# Patient Record
Sex: FEMALE | Race: WHITE | NOT HISPANIC OR LATINO | Employment: PART TIME
[De-identification: names, ages, dates, MRNs, and addresses within clinical notes are randomized per-mention and may not be internally consistent; named-entity substitution may affect disease eponyms.]

---

## 2020-02-24 ENCOUNTER — HEALTH MAINTENANCE LETTER (OUTPATIENT)
Age: 40
End: 2020-02-24

## 2020-12-13 ENCOUNTER — HEALTH MAINTENANCE LETTER (OUTPATIENT)
Age: 40
End: 2020-12-13

## 2021-04-17 ENCOUNTER — HEALTH MAINTENANCE LETTER (OUTPATIENT)
Age: 41
End: 2021-04-17

## 2021-09-26 ENCOUNTER — HEALTH MAINTENANCE LETTER (OUTPATIENT)
Age: 41
End: 2021-09-26

## 2022-05-08 ENCOUNTER — HEALTH MAINTENANCE LETTER (OUTPATIENT)
Age: 42
End: 2022-05-08

## 2023-01-14 ENCOUNTER — HEALTH MAINTENANCE LETTER (OUTPATIENT)
Age: 43
End: 2023-01-14

## 2023-06-02 ENCOUNTER — HEALTH MAINTENANCE LETTER (OUTPATIENT)
Age: 43
End: 2023-06-02

## 2023-11-23 ENCOUNTER — APPOINTMENT (OUTPATIENT)
Dept: CT IMAGING | Facility: CLINIC | Age: 43
End: 2023-11-23
Attending: EMERGENCY MEDICINE
Payer: COMMERCIAL

## 2023-11-23 ENCOUNTER — HOSPITAL ENCOUNTER (EMERGENCY)
Facility: CLINIC | Age: 43
Discharge: HOME | End: 2023-11-23
Attending: EMERGENCY MEDICINE | Admitting: EMERGENCY MEDICINE
Payer: COMMERCIAL

## 2023-11-23 VITALS
TEMPERATURE: 99 F | HEART RATE: 73 BPM | SYSTOLIC BLOOD PRESSURE: 98 MMHG | RESPIRATION RATE: 18 BRPM | OXYGEN SATURATION: 99 % | DIASTOLIC BLOOD PRESSURE: 74 MMHG

## 2023-11-23 DIAGNOSIS — N23 RENAL COLIC: ICD-10-CM

## 2023-11-23 LAB
ALBUMIN UR-MCNC: NEGATIVE MG/DL
ANION GAP SERPL CALCULATED.3IONS-SCNC: 8 MMOL/L (ref 7–15)
APPEARANCE UR: CLEAR
BASOPHILS # BLD AUTO: 0 10E3/UL (ref 0–0.2)
BASOPHILS NFR BLD AUTO: 1 %
BILIRUB UR QL STRIP: NEGATIVE
BUN SERPL-MCNC: 11.8 MG/DL (ref 6–20)
CALCIUM SERPL-MCNC: 8.5 MG/DL (ref 8.6–10)
CHLORIDE SERPL-SCNC: 106 MMOL/L (ref 98–107)
COLOR UR AUTO: ABNORMAL
CREAT SERPL-MCNC: 0.79 MG/DL (ref 0.51–0.95)
DEPRECATED HCO3 PLAS-SCNC: 25 MMOL/L (ref 22–29)
EGFRCR SERPLBLD CKD-EPI 2021: >90 ML/MIN/1.73M2
EOSINOPHIL # BLD AUTO: 0.2 10E3/UL (ref 0–0.7)
EOSINOPHIL NFR BLD AUTO: 2 %
ERYTHROCYTE [DISTWIDTH] IN BLOOD BY AUTOMATED COUNT: 11.9 % (ref 10–15)
GLUCOSE SERPL-MCNC: 103 MG/DL (ref 70–99)
GLUCOSE UR STRIP-MCNC: NEGATIVE MG/DL
HCT VFR BLD AUTO: 38.9 % (ref 35–47)
HGB BLD-MCNC: 12.7 G/DL (ref 11.7–15.7)
HGB UR QL STRIP: NEGATIVE
HOLD SPECIMEN: NORMAL
HOLD SPECIMEN: NORMAL
IMM GRANULOCYTES # BLD: 0 10E3/UL
IMM GRANULOCYTES NFR BLD: 0 %
KETONES UR STRIP-MCNC: NEGATIVE MG/DL
LEUKOCYTE ESTERASE UR QL STRIP: ABNORMAL
LYMPHOCYTES # BLD AUTO: 1.9 10E3/UL (ref 0.8–5.3)
LYMPHOCYTES NFR BLD AUTO: 27 %
MCH RBC QN AUTO: 31.2 PG (ref 26.5–33)
MCHC RBC AUTO-ENTMCNC: 32.6 G/DL (ref 31.5–36.5)
MCV RBC AUTO: 96 FL (ref 78–100)
MONOCYTES # BLD AUTO: 0.6 10E3/UL (ref 0–1.3)
MONOCYTES NFR BLD AUTO: 8 %
NEUTROPHILS # BLD AUTO: 4.4 10E3/UL (ref 1.6–8.3)
NEUTROPHILS NFR BLD AUTO: 62 %
NITRATE UR QL: NEGATIVE
NRBC # BLD AUTO: 0 10E3/UL
NRBC BLD AUTO-RTO: 0 /100
PH UR STRIP: 7 [PH] (ref 5–7)
PLATELET # BLD AUTO: 228 10E3/UL (ref 150–450)
POTASSIUM SERPL-SCNC: 4.3 MMOL/L (ref 3.4–5.3)
RBC # BLD AUTO: 4.07 10E6/UL (ref 3.8–5.2)
RBC URINE: 2 /HPF
SODIUM SERPL-SCNC: 139 MMOL/L (ref 135–145)
SP GR UR STRIP: 1.01 (ref 1–1.03)
SQUAMOUS EPITHELIAL: 3 /HPF
UROBILINOGEN UR STRIP-MCNC: NORMAL MG/DL
WBC # BLD AUTO: 7.1 10E3/UL (ref 4–11)
WBC URINE: 4 /HPF

## 2023-11-23 PROCEDURE — 85025 COMPLETE CBC W/AUTO DIFF WBC: CPT | Performed by: EMERGENCY MEDICINE

## 2023-11-23 PROCEDURE — 74176 CT ABD & PELVIS W/O CONTRAST: CPT

## 2023-11-23 PROCEDURE — 80048 BASIC METABOLIC PNL TOTAL CA: CPT | Performed by: EMERGENCY MEDICINE

## 2023-11-23 PROCEDURE — 81001 URINALYSIS AUTO W/SCOPE: CPT | Performed by: EMERGENCY MEDICINE

## 2023-11-23 PROCEDURE — 36415 COLL VENOUS BLD VENIPUNCTURE: CPT | Performed by: EMERGENCY MEDICINE

## 2023-11-23 PROCEDURE — 82374 ASSAY BLOOD CARBON DIOXIDE: CPT | Performed by: EMERGENCY MEDICINE

## 2023-11-23 PROCEDURE — 87086 URINE CULTURE/COLONY COUNT: CPT | Performed by: EMERGENCY MEDICINE

## 2023-11-23 PROCEDURE — 99284 EMERGENCY DEPT VISIT MOD MDM: CPT | Mod: 25

## 2023-11-23 RX ORDER — ONDANSETRON 4 MG/1
4 TABLET, ORALLY DISINTEGRATING ORAL EVERY 6 HOURS PRN
Qty: 10 TABLET | Refills: 0 | Status: SHIPPED | OUTPATIENT
Start: 2023-11-23 | End: 2023-11-26

## 2023-11-23 RX ORDER — TAMSULOSIN HYDROCHLORIDE 0.4 MG/1
0.4 CAPSULE ORAL DAILY
Qty: 10 CAPSULE | Refills: 0 | Status: SHIPPED | OUTPATIENT
Start: 2023-11-23 | End: 2023-12-03

## 2023-11-23 ASSESSMENT — ACTIVITIES OF DAILY LIVING (ADL): ADLS_ACUITY_SCORE: 37

## 2023-11-24 ENCOUNTER — HOSPITAL ENCOUNTER (EMERGENCY)
Facility: CLINIC | Age: 43
Discharge: HOME | End: 2023-11-24
Attending: EMERGENCY MEDICINE | Admitting: EMERGENCY MEDICINE
Payer: COMMERCIAL

## 2023-11-24 ENCOUNTER — TELEPHONE (OUTPATIENT)
Dept: EMERGENCY MEDICINE | Facility: CLINIC | Age: 43
End: 2023-11-24
Payer: COMMERCIAL

## 2023-11-24 VITALS
TEMPERATURE: 97.1 F | SYSTOLIC BLOOD PRESSURE: 128 MMHG | DIASTOLIC BLOOD PRESSURE: 76 MMHG | HEART RATE: 69 BPM | OXYGEN SATURATION: 100 % | RESPIRATION RATE: 18 BRPM

## 2023-11-24 DIAGNOSIS — N23 RENAL COLIC: ICD-10-CM

## 2023-11-24 LAB — BACTERIA UR CULT: ABNORMAL

## 2023-11-24 PROCEDURE — 99283 EMERGENCY DEPT VISIT LOW MDM: CPT

## 2023-11-24 RX ORDER — OXYCODONE HYDROCHLORIDE 5 MG/1
5 TABLET ORAL EVERY 6 HOURS PRN
Qty: 12 TABLET | Refills: 0 | Status: SHIPPED | OUTPATIENT
Start: 2023-11-24 | End: 2023-11-27

## 2023-11-24 NOTE — ED TRIAGE NOTES
Here for concern of right back pain and left abdominal cramping started couple hours ago. Stated was diagnose with UTI x2 days ago, is currently taking Nitrofurantoin. ABCs intact.      Triage Assessment (Adult)       Row Name 11/23/23 1914          Triage Assessment    Airway WDL WDL        Respiratory WDL    Respiratory WDL WDL        Cardiac WDL    Cardiac WDL WDL

## 2023-11-24 NOTE — DISCHARGE INSTRUCTIONS

## 2023-11-24 NOTE — ED PROVIDER NOTES
History     Chief Complaint:  Flank Pain       HPI   Trish Noriega is a 43 year old female who presents to the ED with ongoing right flank pain.  Patient was seen in our emergency department yesterday and was diagnosed with a 6 mm kidney stone with a negative UA.  She reports that she was discharged home without pain medications.  At home she had a significant flare of her pain prompting her to come to the emergency department.  However by the time she arrived here her pain flare had resolved.  She denies any fevers, chills, nausea or vomiting.  She has not had kidney stone before in the past.    Review of External Notes:   Patient was seen in our emergency department yesterday and was diagnosed with a 6 mm stone at the right UVJ with some mild hydroureter.  Urine shows no signs of infection.  Creatinine was at baseline.    Medications:    oxyCODONE (ROXICODONE) 5 MG tablet  ibuprofen (ADVIL,MOTRIN) 400 MG tablet  ondansetron (ZOFRAN ODT) 4 MG ODT tab  Prenatal Multivit-Min-Fe-FA (PRENATAL PO)  senna-docusate (SENOKOT-S;PERICOLACE) 8.6-50 MG per tablet  tamsulosin (FLOMAX) 0.4 MG capsule        Past Medical History:    Past Medical History:   Diagnosis Date    Allergic state     Hearing impaired     PONV (postoperative nausea and vomiting)        Past Surgical History:    Past Surgical History:   Procedure Laterality Date    C/SECTION, CLASSICAL       SECTION  2012    Procedure:  SECTION;  Repeat  Section ;  Surgeon: Juan C Carmona MD;  Location: [facility]     SECTION N/A 2015    Procedure:  SECTION;  Surgeon: Juan C Carmona MD;  Location: [facility]    ENT SURGERY      left ear surgery        Physical Exam   Patient Vitals for the past 24 hrs:   BP Temp Pulse Resp SpO2   23 0539 123/83 -- -- -- --   23 0538 -- 97.1  F (36.2  C) 72 18 100 %        Physical Exam  General: Patient is awake, alert and interactive when I enter the room.   Head:  The scalp, face, and head appear normal  Eyes: Conjunctivae and sclerae are normal  Neck: Normal range of motion.   CV: Regular rate.   Resp:  No respiratory distress.   GI: abdomen is soft, no rigidity. No evidence of pulsatile mass. No fluid waves or evidence of ascites. No distension. No hernias or bruising are noted in detailed exam. No CVA tenderness.    MS: Normal tone.   Skin: Normal capillary refill noted  Neuro: Speech is normal and fluent. Face is symmetric. Moving all extremities.   Psych:  Normal affect.  Appropriate interactions.    Emergency Department Course     Emergency Department Course & Assessments:    Disposition:  The patient was discharged to home.     Impression & Plan      Medical Decision Making:  Patient is a 43-year-old woman who presents to the emergency department with ongoing right-sided flank pain.  Patient was seen in our emergency department yesterday and was diagnosed with a 6 mm kidney stone at the UVJ.  Urine was negative for signs of infection and creatinine was at baseline.  Unfortunately she was discharged home without any pain medications.  She had a flareup of pain while she was at home last night which prompted her to return to the emergency department.  Currently she has pain and only a 2 out of 10 despite no interventions in the emergency department.  She declined any further pain meds at this time.  I will write her prescription for oxycodone and have her follow-up with urology as needed.  She should still use ibuprofen and Tylenol as her baseline pain medications.  Patient already has prescriptions for Zofran and Flomax which I instructed her how to use.    Diagnosis:    ICD-10-CM    1. Renal colic  N23            Discharge Medications:  New Prescriptions    OXYCODONE (ROXICODONE) 5 MG TABLET    Take 1 tablet (5 mg) by mouth every 6 hours as needed for breakthrough pain          MD Dima Sheets, Kavin Jay MD  11/24/23 0695

## 2023-11-24 NOTE — ED PROVIDER NOTES
History     Chief Complaint:  No chief complaint on file.       HPI   Trish Noriega is a 43 year old female who presents with right flank and right left-sided abdominal pain.  Patient is a 43-year-old female who was recently seen in urgent care.  Patient was told she had a urine infection and placed on Macrobid.  In review of the chart patient's urine culture is growing out beta-hemolytic strep and is likely contaminant.  Patient had urinary frequency without dysuria no fever.  Patient with history of T8 continues to have pain after a few days of Macrobid.  Patient describes more of a urgency to urinate.  She has had no fever chills no vomiting diarrhea.      Independent Historian:   None - Patient Only    Review of External Notes:          Medications:    Oxycodone   Senna-docusate   Macrobid     Past Medical History:    Hearing impaired  Allergic state      Past Surgical History:     x2  Ear surgery        Physical Exam   Patient Vitals for the past 24 hrs:   BP Temp Temp src Pulse Resp SpO2   23 1915 133/77 99  F (37.2  C) Temporal 73 18 99 %        Physical Exam  Vitals reviewed.   Eyes:      General: No scleral icterus.     Pupils: Pupils are equal, round, and reactive to light.   Cardiovascular:      Rate and Rhythm: Normal rate.   Pulmonary:      Effort: Pulmonary effort is normal.   Abdominal:      General: Abdomen is flat. Bowel sounds are normal.   Musculoskeletal:         General: Normal range of motion.   Skin:     General: Skin is warm.      Capillary Refill: Capillary refill takes less than 2 seconds.   Neurological:      General: No focal deficit present.      Mental Status: She is alert and oriented to person, place, and time.   Psychiatric:         Mood and Affect: Mood normal.           Emergency Department Course       Imaging:  Abd/pelvis CT no contrast - Stone Protocol   Final Result   IMPRESSION:    1.  6 mm stone is noted at the right ureterovesicular junction causing very  mild hydroureter and mild fibrosis. Additional 1 mm nonobstructive left kidney. No other stones are identified.                Laboratory:  Labs Ordered and Resulted from Time of ED Arrival to Time of ED Departure   ROUTINE UA WITH MICROSCOPIC REFLEX TO CULTURE - Abnormal       Result Value    Color Urine Straw      Appearance Urine Clear      Glucose Urine Negative      Bilirubin Urine Negative      Ketones Urine Negative      Specific Gravity Urine 1.008      Blood Urine Negative      pH Urine 7.0      Protein Albumin Urine Negative      Urobilinogen Urine Normal      Nitrite Urine Negative      Leukocyte Esterase Urine Moderate (*)     RBC Urine 2      WBC Urine 4      Squamous Epithelials Urine 3 (*)    BASIC METABOLIC PANEL - Abnormal    Sodium 139      Potassium 4.3      Chloride 106      Carbon Dioxide (CO2) 25      Anion Gap 8      Urea Nitrogen 11.8      Creatinine 0.79      GFR Estimate >90      Calcium 8.5 (*)     Glucose 103 (*)    CBC WITH PLATELETS AND DIFFERENTIAL    WBC Count 7.1      RBC Count 4.07      Hemoglobin 12.7      Hematocrit 38.9      MCV 96      MCH 31.2      MCHC 32.6      RDW 11.9      Platelet Count 228      % Neutrophils 62      % Lymphocytes 27      % Monocytes 8      % Eosinophils 2      % Basophils 1      % Immature Granulocytes 0      NRBCs per 100 WBC 0      Absolute Neutrophils 4.4      Absolute Lymphocytes 1.9      Absolute Monocytes 0.6      Absolute Eosinophils 0.2      Absolute Basophils 0.0      Absolute Immature Granulocytes 0.0      Absolute NRBCs 0.0            Emergency Department Course & Assessments:      Interventions:  Medications - No data to display     Assessments:  2010 I obtained history and examined the patient as noted above.     Independent Interpretation (X-rays, CTs, rhythm strip):  None    Consultations/Discussion of Management or Tests:  None        Social Determinants of Health affecting care:   None    Disposition:  The patient was discharged to home.      Impression & Plan        Medical Decision Making:  Patient presents with urinary frequency and urinary symptoms with a UA that was likely contaminated in urgent care.  This was repeated and still negative but CT was recommended due to microscopic hematuria in her first sample.  CT does identify a stone in the right UVJ likely the size or urinary symptoms.  Offered straining urine when she goes to the bathroom Flomax and ibuprofen and follow-up with urology.  Patient was offered reassurance and discharged home.  Patient was well-appearing in the emergency room without requirement for pain control opiates were discouraged if the pain is tolerable      Diagnosis:    ICD-10-CM    1. Renal colic  N23            Discharge Medications:  Discharge Medication List as of 11/23/2023 10:22 PM        START taking these medications    Details   ondansetron (ZOFRAN ODT) 4 MG ODT tab Take 1 tablet (4 mg) by mouth every 6 hours as needed for nausea or vomiting, Disp-10 tablet, R-0, Local Print      tamsulosin (FLOMAX) 0.4 MG capsule Take 1 capsule (0.4 mg) by mouth daily for 10 doses, Disp-10 capsule, R-0, Local Print                Scribe Disclosure:  Randolph MAYA, am serving as a scribe at 8:10 PM on 11/23/2023 to document services personally performed by Serge Stephens MD based on my observations and the provider's statements to me.   11/23/2023   Serge Stephens MD Goodman, Brian Samuel, MD  11/25/23 8616

## 2023-11-24 NOTE — ED TRIAGE NOTES
Pt dx with kidney stones earlier in the evening. Pt reports she wasn't sent home with pain medication and is wanting something for pain

## 2023-11-24 NOTE — TELEPHONE ENCOUNTER
[facility] Emergency Department/Urgent Care Lab result notification  [Note:  ED Lab Results RN will reference the [facility] Emergency Dept visit note prior to contacting patient AND/OR prior to consulting Emergency Dept Provider.  Highlights of Emergency Dept visit in information summary at the bottom of this telephone note]    1. Reason for call  Notify of lab results  Assess patient symptoms [if necessary]  Review ED Providers recommendations/discharge instructions (if necessary)  Advise per [facility] ED lab result protocol    2. Lab Result (including Rx patient on, if applicable).  If culture, copy of lab report at bottom.  Final urine culture on 11/24/23 shows the presence of bacteria(s): 10,000 - 50,000 CFU/mL Streptococcus agalactiae (Group B Streptococcus)  [facility] Emergency Dept discharge antibiotic: None  Recommendations in treatment per [facility] lab result Urine Culture protocol.    3. RN Assessment (Patient's current Symptoms):  Time of call: 1420  Assessment: Pt states she is still having pain from her kidney stone, and has had frequency and burning symptoms with urination. Also states she went to an [facility] clinic and is currently on Macrobid. Will finish Macrobid and follow up with urology     4. RN Recommendations/Instructions per [place] ED lab result protocol  [facility] lab result protocol used: Urine Sonia Chambers was notified of lab result and treatment recommendations    5. Please Contact your PCP clinic or return to the Emergency department if your:  Symptoms return.  Symptoms do not improve after 3 days on antibiotic.  Symptoms do not resolve after completing antibiotic.  Symptoms worsen or other concerning symptoms.    Information summary from Emergency Dept/Urgent Care visit on 11/23/23  Symptoms reported at ED/UC visit (Chief complaint, HPI) Trish Noriega is a 43 year old female who presents to the ED with ongoing right flank pain.   Patient was seen in our emergency department yesterday and was diagnosed with a 6 mm kidney stone with a negative UA.  She reports that she was discharged home without pain medications.  At home she had a significant flare of her pain prompting her to come to the emergency department.  However by the time she arrived here her pain flare had resolved.  She denies any fevers, chills, nausea or vomiting.  She has not had kidney stone before in the past    Significant Medical hx, if applicable (i.e. CKD, diabetes) Reviewed   Allergies Allergies   Allergen Reactions    Cocoa Butter Rash    Iodine Rash    Neomycin Sulfate Rash     Ear drops      Weight, if applicable Wt Readings from Last 2 Encounters:   09/25/15 69.9 kg (154 lb)   12/21/12 70.3 kg (155 lb)      Coumadin/Warfarin [Yes /No] No   Creatinine Level (mg/dl) Creatinine   Date Value Ref Range Status   11/23/2023 0.79 0.51 - 0.95 mg/dL Final   09/27/2015 0.68 0.52 - 1.04 mg/dL Final      Creatinine clearance (ml/min), if applicable Creatinine clearance cannot be calculated (Unknown ideal weight.)   Pregnant (Yes/No/NA) No   Breastfeeding (Yes/No/NA) No   ED/UC Provider Impression and Plan (applicable information) Patient is a 43-year-old woman who presents to the emergency department with ongoing right-sided flank pain.  Patient was seen in our emergency department yesterday and was diagnosed with a 6 mm kidney stone at the UVJ.  Urine was negative for signs of infection and creatinine was at baseline.  Unfortunately she was discharged home without any pain medications.  She had a flareup of pain while she was at home last night which prompted her to return to the emergency department.  Currently she has pain and only a 2 out of 10 despite no interventions in the emergency department.  She declined any further pain meds at this time.  I will write her prescription for oxycodone and have her follow-up with urology as needed.  She should still use ibuprofen and  Tylenol as her baseline pain medications.  Patient already has prescriptions for Zofran and Flomax which I instructed her how to use.    ED/UC diagnosis Renal colic   ED/UC Provider Kavin Ch MD        Copy of Lab report (if applicable)        Lee Hartley RN  [facility]  Emergency Dept Lab Result RN  Ph# [phone]

## 2023-11-27 DIAGNOSIS — R69 DIAGNOSIS UNKNOWN: Primary | ICD-10-CM

## 2023-12-14 ENCOUNTER — TELEPHONE (OUTPATIENT)
Dept: UROLOGY | Facility: CLINIC | Age: 43
End: 2023-12-14

## 2023-12-14 ENCOUNTER — VIRTUAL VISIT (OUTPATIENT)
Dept: UROLOGY | Facility: CLINIC | Age: 43
End: 2023-12-14
Payer: COMMERCIAL

## 2023-12-14 DIAGNOSIS — N20.1 RIGHT URETERAL STONE: Primary | ICD-10-CM

## 2023-12-14 DIAGNOSIS — N20.1 CALCULUS OF URETEROVESICAL JUNCTION (UVJ): ICD-10-CM

## 2023-12-14 PROCEDURE — 99204 OFFICE O/P NEW MOD 45 MIN: CPT | Mod: VID | Performed by: NURSE PRACTITIONER

## 2023-12-14 RX ORDER — TAMSULOSIN HYDROCHLORIDE 0.4 MG/1
0.4 CAPSULE ORAL DAILY
Qty: 30 CAPSULE | Refills: 0 | Status: SHIPPED | OUTPATIENT
Start: 2023-12-14

## 2023-12-14 ASSESSMENT — PAIN SCALES - GENERAL: PAINLEVEL: MILD PAIN (2)

## 2023-12-14 NOTE — PROGRESS NOTES
Urology Video Office Visit    Video-Visit Details    Type of service:  Video Visit    Video Start Time: 0754    Video End Time: 0809    Originating Location (pt. Location): Home    Distant Location (provider location):  Off-site     Platform used for Video Visit: "[employer]           Assessment and Plan:     Assessment: 43 year old female with a right 6mm UVJ stone     Plan:  -Reviewed CT scan with patient. Noted right 6mm UVJ stone.   -The patient and I discussed the diagnosis and natural history of urolithiasis. Discussed future appointment to discussed stone prevention measurements.   -Pt is concerns as she is scheduled for a trip to [place] from 12/26/23-01/02/24.   -We discussed treatment options including observation with MET x 3-4 weeks vs. ureteroscopy and laser lithotripsy. I counseled the patient regarding the potential need for a ureteral stent after treatment and the necessity of removing the stent after surgery. I also discussed the possibility of additional procedures to render the patient stone free.  After discussing risks, benefits, and alternatives to treatment, patient elects to proceed with right ureteroscopy.  -Discussed case with Dr. Duque. Will start on Augmentin BID x 7 days.   -Please use acetaminophen, ibuprofen, dimenhydrinate, and oxycodone PRN for pain control. Will start on tamsulosin 0.4mg PO daily to help with stone passage.   -If having severe flank pain, fevers, chills, nausea, or vomiting please notify Urology clinic or be seen in the ER.     Esthela Branch CNP  Department of Urology  December 14, 2023    I spent a total of 50 minutes spent on the date of the encounter doing chart review, history and exam, documentation, and further activities as noted above.          Chief Complaint:   Right Ureteral Stone         History of Present Illness:    Trish Noriega is a pleasant 43 year old female who presents with concerns of a right ureteral stone.     Ms. Noriega was seen in the  ER on 23 for concerns of right flank pain and abdominal pain. She was previously seen 2 days prior in UC on 23 for concerns of a UTI  due to urinary frequency. Urine culture on 23 revealed 10-50 Group Strep B she was placed on a course Macrobid at that time.     CT scan performed on 23 (images personally reviewed) revealed a right 6mm UVJ stone with mild hydronephrosis. No noted right renal stones. One 1-2mm nonobstructing left renal stone without hydronephrosis.      She was seen again on 23 due to severe right flank pain. She was not prescribe any additional pain medications with her last ER visit. She was discharge with oxycodone.     She notes continue symptoms of urinary frequency and urgency with bladder pressure. Notes right flank pain is manageable with OTC analgesics at this time. Denies any f/c/n/v, gross hematuria, or dysuria.     This is her first kidney stone. Denies any family history of nephrolithiasis.     Stone Risk Factors: None         Past Medical History:     Past Medical History:   Diagnosis Date    Allergic state     Hearing impaired     left ear    PONV (postoperative nausea and vomiting)           Past Surgical History:     Past Surgical History:   Procedure Laterality Date    C/SECTION, CLASSICAL       SECTION  2012    Procedure:  SECTION;  Repeat  Section ;  Surgeon: Juan C Carmona MD;  Location: [facility]     SECTION N/A 2015    Procedure:  SECTION;  Surgeon: Juan C Carmona MD;  Location: [facility]    ENT SURGERY      left ear surgery            Medications     Current Outpatient Medications   Medication    ibuprofen (ADVIL,MOTRIN) 400 MG tablet    Prenatal Multivit-Min-Fe-FA (PRENATAL PO)    senna-docusate (SENOKOT-S;PERICOLACE) 8.6-50 MG per tablet     No current facility-administered medications for this visit.            Family History:   No family history on file.         Social History:      Social History     Socioeconomic History    Marital status:      Spouse name: Not on file    Number of children: Not on file    Years of education: Not on file    Highest education level: Not on file   Occupational History    Not on file   Tobacco Use    Smoking status: Never    Smokeless tobacco: Never   Substance and Sexual Activity    Alcohol use: No    Drug use: No    Sexual activity: Yes     Partners: Male   Other Topics Concern    Not on file   Social History Narrative    Not on file     Social Determinants of Health     Financial Resource Strain: Not on file   Food Insecurity: Not on file   Transportation Needs: Not on file   Physical Activity: Not on file   Stress: Not on file   Social Connections: Not on file   Interpersonal Safety: Not on file   Housing Stability: Not on file            Allergies:   Cocoa butter, Iodine, and Neomycin sulfate         Review of Systems:  From intake questionnaire   Negative 14 system review except as noted on HPI, nurse's note.         Physical Exam:   General Appearance: Well groomed, hygenic  Eyes: No redness, discharge  Respiratory: No cough, no respiratory distress or labored breathing  Musculoskeletal: Grossly normal, full range of motion in upper extremities, no gross deficits  Skin: No discoloration or apparent rashes  Neurologic - No tremors  Psychiatric - Alert and oriented  The rest of a comprehensive physical examination is deferred due to video visit restrictions      Labs:    I personally reviewed all applicable laboratory data and went over findings with patient  Significant for:    CBC RESULTS:  Recent Labs   Lab Test 11/23/23  1935 09/27/15  1330   WBC 7.1  --    HGB 12.7  --     180        BMP RESULTS:  Recent Labs   Lab Test 11/23/23  1935 09/27/15  1330     --    POTASSIUM 4.3  --    CHLORIDE 106  --    CO2 25  --    ANIONGAP 8  --    *  --    BUN 11.8  --    CR 0.79 0.68   GFRESTIMATED >90 >90  Non  GFR  Calc     GFRESTBLACK  --  >90   GFR Calc     NISSA 8.5*  --        UA RESULTS:   Recent Labs   Lab Test 11/23/23  1918 09/27/15  1325   SG 1.008 1.008   URINEPH 7.0 7.0   NITRITE Negative Negative   RBCU 2  --    WBCU 4  --        CALCIUM RESULTS  Lab Results   Component Value Date    NISSA 8.5 11/23/2023           Imaging:    I personally reviewed all applicable imaging and went over the below findings with patient.    Results for orders placed or performed during the hospital encounter of 11/23/23   Abd/pelvis CT no contrast - Stone Protocol    Narrative    EXAM: CT ABDOMEN PELVIS W/O CONTRAST  LOCATION: [facility]  DATE: 11/23/2023    INDICATION: RIGHT FLANK PAIN  COMPARISON: None.  TECHNIQUE: CT scan of the abdomen and pelvis was performed without IV contrast. Multiplanar reformats were obtained. Dose reduction techniques were used.  CONTRAST: None.    FINDINGS:   LOWER CHEST: Normal.    HEPATOBILIARY: Normal.    PANCREAS: Normal.    SPLEEN: Normal.    ADRENAL GLANDS: Normal.    KIDNEYS/BLADDER: 1 mm tiny stone noted inferior pole of left kidney. 6 mm stone is noted at the right ureterovesicular junction. No other stones are identified. No hydronephrosis.     BOWEL: No obstruction or inflammatory change.    LYMPH NODES: Normal.    VASCULATURE: Unremarkable.    PELVIC ORGANS: Bladder and uterus within normal limits.    MUSCULOSKELETAL: Normal.      Impression    IMPRESSION:   1.  6 mm stone is noted at the right ureterovesicular junction causing very mild hydroureter and mild fibrosis. Additional 1 mm nonobstructive left kidney. No other stones are identified.

## 2023-12-14 NOTE — H&P (VIEW-ONLY)
Urology Video Office Visit    Video-Visit Details    Type of service:  Video Visit    Video Start Time: 0754    Video End Time: 0809    Originating Location (pt. Location): Home    Distant Location (provider location):  Off-site     Platform used for Video Visit: Paltalk           Assessment and Plan:     Assessment: 43 year old female with a right 6mm UVJ stone     Plan:  -Reviewed CT scan with patient. Noted right 6mm UVJ stone.   -The patient and I discussed the diagnosis and natural history of urolithiasis. Discussed future appointment to discussed stone prevention measurements.   -Pt is concerns as she is scheduled for a trip to [place] from 12/26/23-01/02/24.   -We discussed treatment options including observation with MET x 3-4 weeks vs. ureteroscopy and laser lithotripsy. I counseled the patient regarding the potential need for a ureteral stent after treatment and the necessity of removing the stent after surgery. I also discussed the possibility of additional procedures to render the patient stone free.  After discussing risks, benefits, and alternatives to treatment, patient elects to proceed with right ureteroscopy.  -Discussed case with Dr. Duque. Will start on Augmentin BID x 7 days.   -Please use acetaminophen, ibuprofen, dimenhydrinate, and oxycodone PRN for pain control. Will start on tamsulosin 0.4mg PO daily to help with stone passage.   -If having severe flank pain, fevers, chills, nausea, or vomiting please notify Urology clinic or be seen in the ER.     Esthela Branch CNP  Department of Urology  December 14, 2023    I spent a total of 50 minutes spent on the date of the encounter doing chart review, history and exam, documentation, and further activities as noted above.          Chief Complaint:   Right Ureteral Stone         History of Present Illness:    Trish Noriega is a pleasant 43 year old female who presents with concerns of a right ureteral stone.     Ms. Noriega was seen in the  ER on 23 for concerns of right flank pain and abdominal pain. She was previously seen 2 days prior in UC on 23 for concerns of a UTI  due to urinary frequency. Urine culture on 23 revealed 10-50 Group Strep B she was placed on a course Macrobid at that time.     CT scan performed on 23 (images personally reviewed) revealed a right 6mm UVJ stone with mild hydronephrosis. No noted right renal stones. One 1-2mm nonobstructing left renal stone without hydronephrosis.      She was seen again on 23 due to severe right flank pain. She was not prescribe any additional pain medications with her last ER visit. She was discharge with oxycodone.     She notes continue symptoms of urinary frequency and urgency with bladder pressure. Notes right flank pain is manageable with OTC analgesics at this time. Denies any f/c/n/v, gross hematuria, or dysuria.     This is her first kidney stone. Denies any family history of nephrolithiasis.     Stone Risk Factors: None         Past Medical History:     Past Medical History:   Diagnosis Date    Allergic state     Hearing impaired     left ear    PONV (postoperative nausea and vomiting)           Past Surgical History:     Past Surgical History:   Procedure Laterality Date    C/SECTION, CLASSICAL       SECTION  2012    Procedure:  SECTION;  Repeat  Section ;  Surgeon: Juan C Carmona MD;  Location: [facility]     SECTION N/A 2015    Procedure:  SECTION;  Surgeon: Juan C Carmona MD;  Location: [facility]    ENT SURGERY      left ear surgery            Medications     Current Outpatient Medications   Medication    ibuprofen (ADVIL,MOTRIN) 400 MG tablet    Prenatal Multivit-Min-Fe-FA (PRENATAL PO)    senna-docusate (SENOKOT-S;PERICOLACE) 8.6-50 MG per tablet     No current facility-administered medications for this visit.            Family History:   No family history on file.         Social History:      Social History     Socioeconomic History    Marital status:      Spouse name: Not on file    Number of children: Not on file    Years of education: Not on file    Highest education level: Not on file   Occupational History    Not on file   Tobacco Use    Smoking status: Never    Smokeless tobacco: Never   Substance and Sexual Activity    Alcohol use: No    Drug use: No    Sexual activity: Yes     Partners: Male   Other Topics Concern    Not on file   Social History Narrative    Not on file     Social Determinants of Health     Financial Resource Strain: Not on file   Food Insecurity: Not on file   Transportation Needs: Not on file   Physical Activity: Not on file   Stress: Not on file   Social Connections: Not on file   Interpersonal Safety: Not on file   Housing Stability: Not on file            Allergies:   Cocoa butter, Iodine, and Neomycin sulfate         Review of Systems:  From intake questionnaire   Negative 14 system review except as noted on HPI, nurse's note.         Physical Exam:   General Appearance: Well groomed, hygenic  Eyes: No redness, discharge  Respiratory: No cough, no respiratory distress or labored breathing  Musculoskeletal: Grossly normal, full range of motion in upper extremities, no gross deficits  Skin: No discoloration or apparent rashes  Neurologic - No tremors  Psychiatric - Alert and oriented  The rest of a comprehensive physical examination is deferred due to video visit restrictions      Labs:    I personally reviewed all applicable laboratory data and went over findings with patient  Significant for:    CBC RESULTS:  Recent Labs   Lab Test 11/23/23  1935 09/27/15  1330   WBC 7.1  --    HGB 12.7  --     180        BMP RESULTS:  Recent Labs   Lab Test 11/23/23  1935 09/27/15  1330     --    POTASSIUM 4.3  --    CHLORIDE 106  --    CO2 25  --    ANIONGAP 8  --    *  --    BUN 11.8  --    CR 0.79 0.68   GFRESTIMATED >90 >90  Non  GFR  Calc     GFRESTBLACK  --  >90   GFR Calc     NISSA 8.5*  --        UA RESULTS:   Recent Labs   Lab Test 11/23/23  1918 09/27/15  1325   SG 1.008 1.008   URINEPH 7.0 7.0   NITRITE Negative Negative   RBCU 2  --    WBCU 4  --        CALCIUM RESULTS  Lab Results   Component Value Date    NISSA 8.5 11/23/2023           Imaging:    I personally reviewed all applicable imaging and went over the below findings with patient.    Results for orders placed or performed during the hospital encounter of 11/23/23   Abd/pelvis CT no contrast - Stone Protocol    Narrative    EXAM: CT ABDOMEN PELVIS W/O CONTRAST  LOCATION: [facility]  DATE: 11/23/2023    INDICATION: RIGHT FLANK PAIN  COMPARISON: None.  TECHNIQUE: CT scan of the abdomen and pelvis was performed without IV contrast. Multiplanar reformats were obtained. Dose reduction techniques were used.  CONTRAST: None.    FINDINGS:   LOWER CHEST: Normal.    HEPATOBILIARY: Normal.    PANCREAS: Normal.    SPLEEN: Normal.    ADRENAL GLANDS: Normal.    KIDNEYS/BLADDER: 1 mm tiny stone noted inferior pole of left kidney. 6 mm stone is noted at the right ureterovesicular junction. No other stones are identified. No hydronephrosis.     BOWEL: No obstruction or inflammatory change.    LYMPH NODES: Normal.    VASCULATURE: Unremarkable.    PELVIC ORGANS: Bladder and uterus within normal limits.    MUSCULOSKELETAL: Normal.      Impression    IMPRESSION:   1.  6 mm stone is noted at the right ureterovesicular junction causing very mild hydroureter and mild fibrosis. Additional 1 mm nonobstructive left kidney. No other stones are identified.

## 2023-12-14 NOTE — PATIENT INSTRUCTIONS
UROLOGY CLINIC VISIT PATIENT INSTRUCTIONS    -Please refer to the following link for information to learn about the ureteroscopy procedure:    [email or web address]      If you have any issues, questions or concerns in the meantime, do not hesitate to contact us at [facility] at [phone] or via [employer]pe[employer].     Esthela Branch CNP  Department of Urology     Medicines to Control Your Kidney Stone Symptoms    Control Pain: First Line Treatment    Dramamine (Please use the drowsy version, nongeneric formulation)  Available over the counter  **This medicine will cause increased drowsiness. DON T DRIVE OR OPERATE MACHINERY FOR 6 HOURS**    How to take:   Take 50 mg at bedtime every night until the stone passes  In addition, take 50 mg every 6 hours as needed    What it does:  Decreases spasm of the ureter  Decreases recurrence of pain for next 24 hours  Decreases severe pain  Decreases nausea  Will help you sleep    Ibuprofen (Advil or Motrin)  Available over the counter  **Please do not take if advise to avoid NSAIDS, history of stomach ulcers/bleeding issues, blood thinners, or already on NSAIDS**    How to take:   Take 2 to 4 (200 mg) tablets every 6 hours for the first 48 hours. After that, use only as needed    What it does:  Decreases pain  Prevents spasm of the ureter    Acetaminophen (Tylenol)   Available over the counter    How to Take:  Take 2 (500 mg) tablets every 6 hours as needed. Do not exceed 8 tablets (4,000 mg total) in 24 hours    What it does:  Highly effective in controlling pain      Control pain: second line treatment (if you still have severe pain 1 hour after trying all of the above)    Narcotics (oxycodone)     How to take   Take 1 to 2 tablets every 4-6 hours as needed    Narcotics have major side effects:   Confusion, disorientation and sleepiness. DO NOT DRIVE OR OPERATE MACHINERY WITHIN 24 HOURS.   Nausea. Take Dramamine, Zofran or Haldol to help control  this.   May cause constipation (hard, dry stools). Start over-the-counter Miralax (1/2 to 1 capful) as needed if experiencing constipation.   Trouble sleeping    Other medicines we may give you:    Tamsulosin (Flomax): Take 0.4 mg daily with food     What it does:   May decrease stone pain   May help stones pass faster   May make surgery more successful by improving access to stone   May decrease discomfort from ureteral stent, if used    Possible side effects:   Lightheadedness when standing too quickly (especially in older people)   Stuffy nose

## 2023-12-14 NOTE — NURSING NOTE
Is the patient currently in the state of [place]? YES    Visit mode:VIDEO    If the visit is dropped, the patient can be reconnected by: VIDEO VISIT: Text to cell phone:   Telephone Information:   Mobile [phone]       Will anyone else be joining the visit? NO  (If patient encounters technical issues they should call [phone]    How would you like to obtain your AVS? MyChart    Are changes needed to the allergy or medication list? No    Reason for visit: Consult (First visit - consult)    Jose F TUCKER

## 2023-12-14 NOTE — TELEPHONE ENCOUNTER
Spoke with: Patient       Date of surgery: Monday Dec 18 2023      Location: [facility]       Informed patient they will need a adult : YES      Pre op with provider: Recent ER visit 11-23 11-24  Sent Dr Duque a message to see if he can do Pre op DOS      Pre procedure covid : Not req       Additional imaging: NA         Surgery Packet : Sent via [employer]folio       Additional comments: Please call for surgery teaching

## 2023-12-15 ENCOUNTER — TELEPHONE (OUTPATIENT)
Dept: UROLOGY | Facility: CLINIC | Age: 43
End: 2023-12-15
Payer: COMMERCIAL

## 2023-12-15 NOTE — TELEPHONE ENCOUNTER
Spoke with patient and completed surgery teaching.  Reviewed time, date and address of procedure.    Length of procedure and what to expect after, phone number given for any questions.  Went over no eating drinking, may have clear liquids 3 hours prior to check in.  Shower in morning before procedure and evening before.  Patient will need a ride home and someone to stay overnight.  Zeenat Glover RN

## 2023-12-18 ENCOUNTER — APPOINTMENT (OUTPATIENT)
Dept: GENERAL RADIOLOGY | Facility: CLINIC | Age: 43
End: 2023-12-18
Attending: STUDENT IN AN ORGANIZED HEALTH CARE EDUCATION/TRAINING PROGRAM
Payer: COMMERCIAL

## 2023-12-18 ENCOUNTER — ANESTHESIA (OUTPATIENT)
Dept: SURGERY | Facility: CLINIC | Age: 43
End: 2023-12-18
Payer: COMMERCIAL

## 2023-12-18 ENCOUNTER — ANESTHESIA EVENT (OUTPATIENT)
Dept: SURGERY | Facility: CLINIC | Age: 43
End: 2023-12-18
Payer: COMMERCIAL

## 2023-12-18 ENCOUNTER — HOSPITAL ENCOUNTER (OUTPATIENT)
Facility: CLINIC | Age: 43
Discharge: HOME | End: 2023-12-18
Attending: STUDENT IN AN ORGANIZED HEALTH CARE EDUCATION/TRAINING PROGRAM | Admitting: STUDENT IN AN ORGANIZED HEALTH CARE EDUCATION/TRAINING PROGRAM
Payer: COMMERCIAL

## 2023-12-18 VITALS
HEART RATE: 76 BPM | TEMPERATURE: 97.8 F | OXYGEN SATURATION: 97 % | RESPIRATION RATE: 18 BRPM | WEIGHT: 141.4 LBS | DIASTOLIC BLOOD PRESSURE: 67 MMHG | SYSTOLIC BLOOD PRESSURE: 105 MMHG | HEIGHT: 64 IN | BODY MASS INDEX: 24.14 KG/M2

## 2023-12-18 DIAGNOSIS — N20.0 NEPHROLITHIASIS: Primary | ICD-10-CM

## 2023-12-18 LAB — HCG UR QL: NEGATIVE

## 2023-12-18 PROCEDURE — 250N000009 HC RX 250: Performed by: NURSE ANESTHETIST, CERTIFIED REGISTERED

## 2023-12-18 PROCEDURE — 258N000003 HC RX IP 258 OP 636: Performed by: NURSE ANESTHETIST, CERTIFIED REGISTERED

## 2023-12-18 PROCEDURE — 74420 UROGRAPHY RTRGR +-KUB: CPT | Mod: 26 | Performed by: STUDENT IN AN ORGANIZED HEALTH CARE EDUCATION/TRAINING PROGRAM

## 2023-12-18 PROCEDURE — 710N000009 HC RECOVERY PHASE 1, LEVEL 1, PER MIN: Performed by: STUDENT IN AN ORGANIZED HEALTH CARE EDUCATION/TRAINING PROGRAM

## 2023-12-18 PROCEDURE — 710N000012 HC RECOVERY PHASE 2, PER MINUTE: Performed by: STUDENT IN AN ORGANIZED HEALTH CARE EDUCATION/TRAINING PROGRAM

## 2023-12-18 PROCEDURE — 999N000141 HC STATISTIC PRE-PROCEDURE NURSING ASSESSMENT: Performed by: STUDENT IN AN ORGANIZED HEALTH CARE EDUCATION/TRAINING PROGRAM

## 2023-12-18 PROCEDURE — 370N000017 HC ANESTHESIA TECHNICAL FEE, PER MIN: Performed by: STUDENT IN AN ORGANIZED HEALTH CARE EDUCATION/TRAINING PROGRAM

## 2023-12-18 PROCEDURE — 52351 CYSTOURETERO & OR PYELOSCOPE: CPT | Mod: RT | Performed by: STUDENT IN AN ORGANIZED HEALTH CARE EDUCATION/TRAINING PROGRAM

## 2023-12-18 PROCEDURE — 999N000179 XR SURGERY CARM FLUORO LESS THAN 5 MIN W STILLS: Mod: TC

## 2023-12-18 PROCEDURE — 250N000011 HC RX IP 250 OP 636: Performed by: NURSE ANESTHETIST, CERTIFIED REGISTERED

## 2023-12-18 PROCEDURE — 360N000077 HC SURGERY LEVEL 4, PER MIN: Performed by: STUDENT IN AN ORGANIZED HEALTH CARE EDUCATION/TRAINING PROGRAM

## 2023-12-18 PROCEDURE — 81025 URINE PREGNANCY TEST: CPT | Performed by: STUDENT IN AN ORGANIZED HEALTH CARE EDUCATION/TRAINING PROGRAM

## 2023-12-18 PROCEDURE — C1758 CATHETER, URETERAL: HCPCS | Performed by: STUDENT IN AN ORGANIZED HEALTH CARE EDUCATION/TRAINING PROGRAM

## 2023-12-18 PROCEDURE — 272N000001 HC OR GENERAL SUPPLY STERILE: Performed by: STUDENT IN AN ORGANIZED HEALTH CARE EDUCATION/TRAINING PROGRAM

## 2023-12-18 PROCEDURE — 250N000009 HC RX 250: Performed by: STUDENT IN AN ORGANIZED HEALTH CARE EDUCATION/TRAINING PROGRAM

## 2023-12-18 PROCEDURE — C1769 GUIDE WIRE: HCPCS | Performed by: STUDENT IN AN ORGANIZED HEALTH CARE EDUCATION/TRAINING PROGRAM

## 2023-12-18 PROCEDURE — 250N000011 HC RX IP 250 OP 636: Performed by: STUDENT IN AN ORGANIZED HEALTH CARE EDUCATION/TRAINING PROGRAM

## 2023-12-18 PROCEDURE — 250N000011 HC RX IP 250 OP 636: Mod: JZ | Performed by: STUDENT IN AN ORGANIZED HEALTH CARE EDUCATION/TRAINING PROGRAM

## 2023-12-18 RX ORDER — CEFAZOLIN SODIUM/WATER 2 G/20 ML
2 SYRINGE (ML) INTRAVENOUS SEE ADMIN INSTRUCTIONS
Status: DISCONTINUED | OUTPATIENT
Start: 2023-12-18 | End: 2023-12-18 | Stop reason: HOSPADM

## 2023-12-18 RX ORDER — CEFAZOLIN SODIUM/WATER 2 G/20 ML
2 SYRINGE (ML) INTRAVENOUS
Status: COMPLETED | OUTPATIENT
Start: 2023-12-18 | End: 2023-12-18

## 2023-12-18 RX ORDER — LABETALOL HYDROCHLORIDE 5 MG/ML
10 INJECTION, SOLUTION INTRAVENOUS
Status: DISCONTINUED | OUTPATIENT
Start: 2023-12-18 | End: 2023-12-18 | Stop reason: HOSPADM

## 2023-12-18 RX ORDER — ONDANSETRON 2 MG/ML
INJECTION INTRAMUSCULAR; INTRAVENOUS PRN
Status: DISCONTINUED | OUTPATIENT
Start: 2023-12-18 | End: 2023-12-18

## 2023-12-18 RX ORDER — DEXAMETHASONE SODIUM PHOSPHATE 4 MG/ML
INJECTION, SOLUTION INTRA-ARTICULAR; INTRALESIONAL; INTRAMUSCULAR; INTRAVENOUS; SOFT TISSUE PRN
Status: DISCONTINUED | OUTPATIENT
Start: 2023-12-18 | End: 2023-12-18

## 2023-12-18 RX ORDER — HYDROXYZINE HYDROCHLORIDE 25 MG/1
25 TABLET, FILM COATED ORAL EVERY 6 HOURS PRN
Status: DISCONTINUED | OUTPATIENT
Start: 2023-12-18 | End: 2023-12-18 | Stop reason: HOSPADM

## 2023-12-18 RX ORDER — PROPOFOL 10 MG/ML
INJECTION, EMULSION INTRAVENOUS CONTINUOUS PRN
Status: DISCONTINUED | OUTPATIENT
Start: 2023-12-18 | End: 2023-12-18

## 2023-12-18 RX ORDER — IOPAMIDOL 612 MG/ML
INJECTION, SOLUTION INTRATHECAL PRN
Status: DISCONTINUED | OUTPATIENT
Start: 2023-12-18 | End: 2023-12-18 | Stop reason: HOSPADM

## 2023-12-18 RX ORDER — ONDANSETRON 2 MG/ML
4 INJECTION INTRAMUSCULAR; INTRAVENOUS EVERY 30 MIN PRN
Status: DISCONTINUED | OUTPATIENT
Start: 2023-12-18 | End: 2023-12-18 | Stop reason: HOSPADM

## 2023-12-18 RX ORDER — MAGNESIUM HYDROXIDE 1200 MG/15ML
LIQUID ORAL PRN
Status: DISCONTINUED | OUTPATIENT
Start: 2023-12-18 | End: 2023-12-18 | Stop reason: HOSPADM

## 2023-12-18 RX ORDER — SODIUM CHLORIDE, SODIUM LACTATE, POTASSIUM CHLORIDE, CALCIUM CHLORIDE 600; 310; 30; 20 MG/100ML; MG/100ML; MG/100ML; MG/100ML
INJECTION, SOLUTION INTRAVENOUS CONTINUOUS PRN
Status: DISCONTINUED | OUTPATIENT
Start: 2023-12-18 | End: 2023-12-18

## 2023-12-18 RX ORDER — FENTANYL CITRATE 50 UG/ML
25 INJECTION, SOLUTION INTRAMUSCULAR; INTRAVENOUS EVERY 5 MIN PRN
Status: DISCONTINUED | OUTPATIENT
Start: 2023-12-18 | End: 2023-12-18 | Stop reason: HOSPADM

## 2023-12-18 RX ORDER — HYDROMORPHONE HCL IN WATER/PF 6 MG/30 ML
0.2 PATIENT CONTROLLED ANALGESIA SYRINGE INTRAVENOUS EVERY 5 MIN PRN
Status: DISCONTINUED | OUTPATIENT
Start: 2023-12-18 | End: 2023-12-18 | Stop reason: HOSPADM

## 2023-12-18 RX ORDER — HYDROMORPHONE HCL IN WATER/PF 6 MG/30 ML
0.4 PATIENT CONTROLLED ANALGESIA SYRINGE INTRAVENOUS EVERY 5 MIN PRN
Status: DISCONTINUED | OUTPATIENT
Start: 2023-12-18 | End: 2023-12-18 | Stop reason: HOSPADM

## 2023-12-18 RX ORDER — FENTANYL CITRATE 50 UG/ML
INJECTION, SOLUTION INTRAMUSCULAR; INTRAVENOUS PRN
Status: DISCONTINUED | OUTPATIENT
Start: 2023-12-18 | End: 2023-12-18

## 2023-12-18 RX ORDER — HYDRALAZINE HYDROCHLORIDE 20 MG/ML
2.5-5 INJECTION INTRAMUSCULAR; INTRAVENOUS EVERY 10 MIN PRN
Status: DISCONTINUED | OUTPATIENT
Start: 2023-12-18 | End: 2023-12-18 | Stop reason: HOSPADM

## 2023-12-18 RX ORDER — FENTANYL CITRATE 50 UG/ML
50 INJECTION, SOLUTION INTRAMUSCULAR; INTRAVENOUS EVERY 5 MIN PRN
Status: DISCONTINUED | OUTPATIENT
Start: 2023-12-18 | End: 2023-12-18 | Stop reason: HOSPADM

## 2023-12-18 RX ORDER — LIDOCAINE HYDROCHLORIDE 20 MG/ML
INJECTION, SOLUTION INFILTRATION; PERINEURAL PRN
Status: DISCONTINUED | OUTPATIENT
Start: 2023-12-18 | End: 2023-12-18

## 2023-12-18 RX ORDER — ACETAMINOPHEN 325 MG/1
975 TABLET ORAL ONCE
Status: DISCONTINUED | OUTPATIENT
Start: 2023-12-18 | End: 2023-12-18 | Stop reason: HOSPADM

## 2023-12-18 RX ORDER — DIMENHYDRINATE 50 MG/ML
25 INJECTION, SOLUTION INTRAMUSCULAR; INTRAVENOUS
Status: DISCONTINUED | OUTPATIENT
Start: 2023-12-18 | End: 2023-12-18 | Stop reason: HOSPADM

## 2023-12-18 RX ORDER — PROPOFOL 10 MG/ML
INJECTION, EMULSION INTRAVENOUS PRN
Status: DISCONTINUED | OUTPATIENT
Start: 2023-12-18 | End: 2023-12-18

## 2023-12-18 RX ORDER — SODIUM CHLORIDE, SODIUM LACTATE, POTASSIUM CHLORIDE, CALCIUM CHLORIDE 600; 310; 30; 20 MG/100ML; MG/100ML; MG/100ML; MG/100ML
INJECTION, SOLUTION INTRAVENOUS CONTINUOUS
Status: DISCONTINUED | OUTPATIENT
Start: 2023-12-18 | End: 2023-12-18 | Stop reason: HOSPADM

## 2023-12-18 RX ORDER — ONDANSETRON 4 MG/1
4 TABLET, ORALLY DISINTEGRATING ORAL EVERY 30 MIN PRN
Status: DISCONTINUED | OUTPATIENT
Start: 2023-12-18 | End: 2023-12-18 | Stop reason: HOSPADM

## 2023-12-18 RX ADMIN — FENTANYL CITRATE 50 MCG: 50 INJECTION INTRAMUSCULAR; INTRAVENOUS at 10:16

## 2023-12-18 RX ADMIN — PHENYLEPHRINE HYDROCHLORIDE 100 MCG: 10 INJECTION INTRAVENOUS at 10:39

## 2023-12-18 RX ADMIN — Medication 2 G: at 10:11

## 2023-12-18 RX ADMIN — MIDAZOLAM 2 MG: 1 INJECTION INTRAMUSCULAR; INTRAVENOUS at 10:12

## 2023-12-18 RX ADMIN — ONDANSETRON 4 MG: 2 INJECTION INTRAMUSCULAR; INTRAVENOUS at 10:12

## 2023-12-18 RX ADMIN — PHENYLEPHRINE HYDROCHLORIDE 100 MCG: 10 INJECTION INTRAVENOUS at 10:36

## 2023-12-18 RX ADMIN — DEXAMETHASONE SODIUM PHOSPHATE 8 MG: 4 INJECTION, SOLUTION INTRA-ARTICULAR; INTRALESIONAL; INTRAMUSCULAR; INTRAVENOUS; SOFT TISSUE at 10:19

## 2023-12-18 RX ADMIN — SODIUM CHLORIDE, POTASSIUM CHLORIDE, SODIUM LACTATE AND CALCIUM CHLORIDE: 600; 310; 30; 20 INJECTION, SOLUTION INTRAVENOUS at 10:11

## 2023-12-18 RX ADMIN — LIDOCAINE HYDROCHLORIDE 80 MG: 20 INJECTION, SOLUTION INFILTRATION; PERINEURAL at 10:16

## 2023-12-18 RX ADMIN — PROPOFOL 200 MG: 10 INJECTION, EMULSION INTRAVENOUS at 10:16

## 2023-12-18 RX ADMIN — PROPOFOL 175 MCG/KG/MIN: 10 INJECTION, EMULSION INTRAVENOUS at 10:16

## 2023-12-18 ASSESSMENT — ACTIVITIES OF DAILY LIVING (ADL)
ADLS_ACUITY_SCORE: 20
ADLS_ACUITY_SCORE: 20

## 2023-12-18 ASSESSMENT — LIFESTYLE VARIABLES: TOBACCO_USE: 0

## 2023-12-18 NOTE — OP NOTE
OPERATIVE REPORT    PREOPERATIVE DIAGNOSIS:  Right ureteral stone [N20.1]  Calculus of ureterovesical junction (UVJ) [N20.1]     POSTOPERATIVE DIAGNOSIS:  Same    PROCEDURES PERFORMED:   1. Cystoscopy  2. Right Ureteroscopy   3. Right  Retrograde Pyelogram with interpretation of imaging    STAFF SURGEON: Saul Duque MD was present and participatory for the entire case.   RESIDENT(S): None    ANESTHESIA: General    ESTIMATED BLOOD LOSS: <5 ml    DRAINS/TUBES:   None    IV FLUIDS: Please see dictated anesthesia record  COMPLICATIONS: None.   SPECIMEN:   None    SIGNIFICANT FINDINGS:   Right ureter without any stones, bladder without stones.  Right retrograde pyelogram without filling defects and minimal hydronephrosis.       BRIEF OPERATIVE INDICATIONS:  Trish Noriega is a(n) 43 year old female who presented with a right distal ureter stone that was 6 mm. She reports continuing to have intermittent RLQ pain and traveling next week and wanted to get the stone addressed. She had a positive pre-op urine culture that was treated. After a discussion of all risks, benefits, and alternatives, the patient elected to proceed with definitive stone management with a ureteroscopic approach.    After induction of general anesthesia the patient was repositioned in dorsal lithotomy and prepped and draped in the standard sterile fashion.  A time out was performed confirming the appropriate patient identity and planned procedure.  The patient received culture directed antibiotics and had bilateral sequential compression devices for DVT propylaxis.    A 22-Nepali rigid cystoscope was inserted into a well-lubricated urethra. The urethra was unremarkable without stricture.     Bladder examined in detail and no stones were noted  No tumors noted  UO orthotopic    Right ureter orifice cannulated with sensor wire with aid of dual lumen. Dual lumen advanced to distal ureter with minimal resistance and retrograde pyelogram  performed that showed no filling defects and minimal hydronephrosis. The  film did not show any radioopaque stones.     Semi-rigid Ureteroscopy  A semi-rigid ureteroscope was introduced and advanced adjacent to the wire up the ureter. No stones were noted. We were able to go into the proximal ureter . Pullback ureteroscopy was performed and showed no stone or significant ureteral injury.    The bladder was drained    Bladder examined once again with cystoscope and there was good efflux from the ureter. So no stent placed.    The patient tolerated the procedure well and there were no apparent complications. The patient  was transported to the postanesthesia care unit in stable condition.     POSTOP PLAN:  Suspect she must have passed the stone.   She has non obstructing 1 mm stone in the left kidney  We will plan to see her back in 6 month virtual visit with renal US    Saul Duque MD

## 2023-12-18 NOTE — ANESTHESIA POSTPROCEDURE EVALUATION
Patient: Trish Noriega    Procedure: Procedure(s):  Cystoscopy, Right Ureteroscopy, Right Retrograde Pyelogram       Anesthesia Type:  General    Note:     Postop Pain Control: Uneventful            Sign Out: Well controlled pain   PONV: No   Neuro/Psych: Uneventful            Sign Out: Acceptable/Baseline neuro status   Airway/Respiratory: Uneventful            Sign Out: Acceptable/Baseline resp. status   CV/Hemodynamics: Uneventful            Sign Out: Acceptable CV status   Other NRE: NONE   DID A NON-ROUTINE EVENT OCCUR?            Last vitals:  Vitals Value Taken Time   BP 95/66 12/18/23 1130   Temp     Pulse 77 12/18/23 1133   Resp 18 12/18/23 1133   SpO2 97 % 12/18/23 1133   Vitals shown include unfiled device data.    Electronically Signed By: Abilio Smyth MD  December 18, 2023  1:45 PM

## 2023-12-18 NOTE — ANESTHESIA CARE TRANSFER NOTE
Patient: Trish Noriega    Procedure: Procedure(s):  Cystoscopy, Right Ureteroscopy, Right Retrograde Pyelogram       Diagnosis: Right ureteral stone [N20.1]  Calculus of ureterovesical junction (UVJ) [N20.1]  Diagnosis Additional Information: No value filed.    Anesthesia Type:   General     Note:    Oropharynx: oropharynx clear of all foreign objects and spontaneously breathing  Level of Consciousness: awake  Oxygen Supplementation: face mask  Level of Supplemental Oxygen (L/min / FiO2): 8  Independent Airway: airway patency satisfactory and stable  Dentition: dentition unchanged  Vital Signs Stable: post-procedure vital signs reviewed and stable  Report to RN Given: handoff report given  Patient transferred to: PACU    Handoff Report: Identifed the Patient, Identified the Reponsible Provider, Reviewed the pertinent medical history, Discussed the surgical course, Reviewed Intra-OP anesthesia mangement and issues during anesthesia, Set expectations for post-procedure period and Allowed opportunity for questions and acknowledgement of understanding      Vitals:  Vitals Value Taken Time   BP     Temp     Pulse     Resp     SpO2         Electronically Signed By: MARIANN Barrera CRNA  December 18, 2023  10:48 AM

## 2023-12-18 NOTE — BRIEF OP NOTE
[facility] Brief Operative Note    Pre-operative diagnosis: Right ureteral stone [N20.1]  Calculus of ureterovesical junction (UVJ) [N20.1]   Post-operative diagnosis * No post-op diagnosis entered *   Procedure: Procedure(s):  Cystoscopy, Right Ureteroscopy, Right Retrograde Pyelogram   Surgeon: Saul Duque MD   Assistants(s): none   Estimated blood loss: Minimal    Specimens: None   Findings: Right ureter without any stones, bladder without stones.

## 2023-12-18 NOTE — DISCHARGE INSTRUCTIONS
Same Day Surgery Discharge Instructions for  Sedation and General Anesthesia     It's not unusual to feel dizzy, light-headed or faint for up to 24 hours after surgery or while taking pain medication.  If you have these symptoms: sit for a few minutes before standing and have someone assist you when you get up to walk or use the bathroom.    You should rest and relax for the next 24 hours. We recommend you make arrangements to have an adult stay with you for at least 24 hours after your discharge.  Avoid hazardous and strenuous activity.    DO NOT DRIVE any vehicle or operate mechanical equipment for 24 hours following the end of your surgery.  Even though you may feel normal, your reactions may be affected by the medication you have received.    Do not drink alcoholic beverages for 24 hours following surgery.     Slowly progress to your regular diet as you feel able. It's not unusual to feel nauseated and/or vomit after receiving anesthesia.  If you develop these symptoms, drink clear liquids (apple juice, ginger ale, broth, 7-up, etc. ) until you feel better.  If your nausea and vomiting persists for 24 hours, please notify your surgeon.      All narcotic pain medications, along with inactivity and anesthesia, can cause constipation. Drinking plenty of liquids and increasing fiber intake will help.    For any questions of a medical nature, call your surgeon.    Do not make important decisions for 24 hours.    If you had general anesthesia, you may have a sore throat for a couple of days related to the breathing tube used during surgery.  You may use Cepacol lozenges to help with this discomfort.  If it worsens or if you develop a fever, contact your surgeon.     If you feel your pain is not well managed with the pain medications prescribed by your surgeon, please contact your surgeon's office to let them know so they can address your concerns.     Cystoscopy Discharge Instructions      Diet:  Return to the diet  that you were on before the procedure, unless you are given specific diet instructions.  It is important to drink 6-8 glasses of fluids per day at home - at least 3-4 glasses should be water.    Activity:  Walk short distances and increase as your strength allows.  You may climb stairs.  Do not do strenuous exercise or heavy lifting until approved by surgeon.  Do not drive while taking narcotic pain medications.    Bathing:  You may take a shower.    Call your physician if these signs/symptoms are present:  Pain that is not relieved by a short rest or ordered pain medications.  Temperature at or above 101.0 F or chills.  Inability or difficulty urinating.  Excessive blood in urine.  Any questions or concerns.  **If you have questions or concerns about your procedure,   call Dr. Duque at [phone]**       Normal to have some pain over your right kidney and back for next 24 hours  Normal to have some blood in urine on and off      When to call your doctor?  Nausea, vomiting and unable to drink or keep down liquids  Chills, fever higher than 101  The stent falls out  Difficulty or inability to urinate  Constantly leaking urine  Severe pain that is not relieved by pain medication    Remember  These symptoms are common, and do not require medical help. They will pass with time: If you are still concerned, please contact your doctor s office.  Blood in urine  Pain or discomfort  Urinary frequency or urgency  Burning or pain during urination  Sensation of incomplete emptying of the bladder          Follow-Up:  - Follow up with [facility] in 6 month with ultrasound of kidney  - Call or return sooner than your regularly scheduled visit if you develop any of the following: fever (greater than 101.5), uncontrolled pain, uncontrolled nausea or vomiting, as well as increased redness, swelling, or drainage from your wound.     Phone numbers:   - Monday through Friday 8am to 4:30pm: Call [phone] with  "questions or to schedule or confirm appointment.    - Nights or weekends: call the after hours emergency pager - [phone] and tell the  \"I would like to page the Urology Resident on call.\"  - For emergencies, call [phone]      "

## 2023-12-18 NOTE — ANESTHESIA PROCEDURE NOTES
Airway       Patient location during procedure: OR  Staff -        Anesthesiologist:  Abilio Smyth MD       CRNA: Jihan Lemus APRN CRNA       Performed By: CRNA  Consent for Airway        Urgency: elective  Indications and Patient Condition       Indications for airway management: sejal-procedural       Induction type:intravenous       Mask difficulty assessment: 0 - not attempted    Final Airway Details       Final airway type: supraglottic airway    Supraglottic Airway Details        Type: LMA       Brand: I-Gel       LMA size: 4    Post intubation assessment        Placement verified by: capnometry, equal breath sounds and chest rise        Number of attempts at approach: 1       Number of other approaches attempted: 0       Secured with: tape       Ease of procedure: easy       Dentition: Intact and Unchanged

## 2023-12-18 NOTE — ANESTHESIA PREPROCEDURE EVALUATION
Anesthesia Pre-Procedure Evaluation    Patient: Trish Noriega   MRN: [id number] : 1980        Procedure : Procedure(s):  Cystoscopy, Right Ureteroscopy, Right Holmium Laser Lithotripsy, Right Retrograde Pyelogram, Possible Right Ureteral Stent Placement          Past Medical History:   Diagnosis Date    Allergic state     Hearing impaired     left ear    PONV (postoperative nausea and vomiting)       Past Surgical History:   Procedure Laterality Date    C/SECTION, CLASSICAL       SECTION  2012    Procedure:  SECTION;  Repeat  Section ;  Surgeon: Juan C Carmona MD;  Location: [facility]     SECTION N/A 2015    Procedure:  SECTION;  Surgeon: Juan C Carmona MD;  Location: [facility]    ENT SURGERY      left ear surgery      Allergies   Allergen Reactions    Cocoa Butter Rash    Iodine Rash    Neomycin Sulfate Rash     Ear drops      Social History     Tobacco Use    Smoking status: Never    Smokeless tobacco: Never   Substance Use Topics    Alcohol use: No      Wt Readings from Last 1 Encounters:   09/25/15 69.9 kg (154 lb)        Anesthesia Evaluation   Pt has had prior anesthetic.     History of anesthetic complications  - PONV.      ROS/MED HX  ENT/Pulmonary:    (-) tobacco use   Neurologic:       Cardiovascular:       METS/Exercise Tolerance:     Hematologic:       Musculoskeletal:       GI/Hepatic:       Renal/Genitourinary:     (+)       Nephrolithiasis , BPH,      Endo:       Psychiatric/Substance Use:       Infectious Disease:       Malignancy:       Other:            Physical Exam    Airway        Mallampati: II   TM distance: > 3 FB   Neck ROM: full   Mouth opening: > 3 cm    Respiratory Devices and Support         Dental           Cardiovascular          Rhythm and rate: regular and normal     Pulmonary           breath sounds clear to auscultation           OUTSIDE LABS:  CBC:   Lab Results   Component Value Date    WBC 7.1 2023    WBC  "14.7 (H) 10/09/2009    HGB 12.7 11/23/2023    HGB 10.5 (L) 09/27/2015    HCT 38.9 11/23/2023    HCT 29.2 (L) 10/09/2009     11/23/2023     09/27/2015     BMP:   Lab Results   Component Value Date     11/23/2023    POTASSIUM 4.3 11/23/2023    CHLORIDE 106 11/23/2023    CO2 25 11/23/2023    BUN 11.8 11/23/2023    CR 0.79 11/23/2023    CR 0.68 09/27/2015     (H) 11/23/2023     COAGS: No results found for: \"PTT\", \"INR\", \"FIBR\"  POC: No results found for: \"BGM\", \"HCG\", \"HCGS\"  HEPATIC:   Lab Results   Component Value Date    AST 18 09/27/2015     OTHER:   Lab Results   Component Value Date    NISSA 8.5 (L) 11/23/2023       Anesthesia Plan    ASA Status:  2    NPO Status:  NPO Appropriate    Anesthesia Type: General.     - Airway: LMA   Induction: Intravenous.   Maintenance: TIVA.        Consents    Anesthesia Plan(s) and associated risks, benefits, and realistic alternatives discussed. Questions answered and patient/representative(s) expressed understanding.     - Discussed:     - Discussed with:  Patient            Postoperative Care    Pain management: IV analgesics, Oral pain medications, Multi-modal analgesia.   PONV prophylaxis: Ondansetron (or other 5HT-3), Dexamethasone or Solumedrol, Background Propofol Infusion     Comments:               Abilio Smyth MD    I have reviewed the pertinent notes and labs in the chart from the past 30 days and (re)examined the patient.  Any updates or changes from those notes are reflected in this note.                  "

## 2023-12-18 NOTE — PROGRESS NOTES
I met with Trish today and her mother in law. She has a 6 mm distal stone and has not seem to have passed it. She is traveling to [place] next week and wants to get it addressed before she leaves. She had positive urine culture with group B strep and has been on antibiotic and feels well, no fevers. Her pain is mild.     We identified and discussed risks of ureteroscopic stone clearance including but not limited to ureteral injury, infection,incomplete stone clearance, and possible necessity of unanticipated additional procedures.We discussed the need for potential stent and second look urs if the ureter is narrow.     OR for right urs    Saul Duque MD

## 2024-01-10 DIAGNOSIS — N20.1 RIGHT URETERAL STONE: ICD-10-CM

## 2024-01-10 DIAGNOSIS — N20.1 CALCULUS OF URETEROVESICAL JUNCTION (UVJ): ICD-10-CM

## 2024-01-10 RX ORDER — TAMSULOSIN HYDROCHLORIDE 0.4 MG/1
0.4 CAPSULE ORAL DAILY
Qty: 30 CAPSULE | Refills: 0 | OUTPATIENT
Start: 2024-01-10

## 2024-02-11 ENCOUNTER — HEALTH MAINTENANCE LETTER (OUTPATIENT)
Age: 44
End: 2024-02-11

## 2024-06-21 ENCOUNTER — HOSPITAL ENCOUNTER (OUTPATIENT)
Dept: ULTRASOUND IMAGING | Facility: CLINIC | Age: 44
Discharge: HOME | End: 2024-06-21
Attending: STUDENT IN AN ORGANIZED HEALTH CARE EDUCATION/TRAINING PROGRAM | Admitting: STUDENT IN AN ORGANIZED HEALTH CARE EDUCATION/TRAINING PROGRAM
Payer: COMMERCIAL

## 2024-06-21 DIAGNOSIS — N20.0 NEPHROLITHIASIS: ICD-10-CM

## 2024-06-21 PROCEDURE — 76770 US EXAM ABDO BACK WALL COMP: CPT

## 2024-06-30 ENCOUNTER — HEALTH MAINTENANCE LETTER (OUTPATIENT)
Age: 44
End: 2024-06-30

## 2025-07-13 ENCOUNTER — HEALTH MAINTENANCE LETTER (OUTPATIENT)
Age: 45
End: 2025-07-13

## (undated) DEVICE — DECANTER BAG 2002S

## (undated) DEVICE — CATH URETERAL OPEN END 6FR AXXCESS

## (undated) DEVICE — SOL WATER IRRIG 1000ML BOTTLE 2F7114

## (undated) DEVICE — PACK CYSTOSCOPY SBA15CYFSI

## (undated) DEVICE — BAG DRAIN URO FOR SIEMENS 8MM ADAPTER NS CC164NS-A

## (undated) DEVICE — PAD CHUX UNDERPAD 23X24" 7136

## (undated) DEVICE — CATH URETERAL DUAL LUMEN 10FRX54CM M0064051000

## (undated) DEVICE — GUIDEWIRE SENSOR DUAL FLEX STR 0.035"X150CM M0066703080

## (undated) DEVICE — SOL NACL 0.9% IRRIG 3000ML BAG 2B7477

## (undated) RX ORDER — PROPOFOL 10 MG/ML
INJECTION, EMULSION INTRAVENOUS
Status: DISPENSED
Start: 2023-12-18

## (undated) RX ORDER — CEFAZOLIN SODIUM/WATER 2 G/20 ML
SYRINGE (ML) INTRAVENOUS
Status: DISPENSED
Start: 2023-12-18

## (undated) RX ORDER — DEXAMETHASONE SODIUM PHOSPHATE 4 MG/ML
INJECTION, SOLUTION INTRA-ARTICULAR; INTRALESIONAL; INTRAMUSCULAR; INTRAVENOUS; SOFT TISSUE
Status: DISPENSED
Start: 2023-12-18

## (undated) RX ORDER — CEFAZOLIN SODIUM/WATER 2 G/20 ML
SYRINGE (ML) INTRAVENOUS
Status: DISPENSED
Start: 2023-12-15

## (undated) RX ORDER — ONDANSETRON 2 MG/ML
INJECTION INTRAMUSCULAR; INTRAVENOUS
Status: DISPENSED
Start: 2023-12-18

## (undated) RX ORDER — FENTANYL CITRATE 50 UG/ML
INJECTION, SOLUTION INTRAMUSCULAR; INTRAVENOUS
Status: DISPENSED
Start: 2023-12-18